# Patient Record
Sex: FEMALE
[De-identification: names, ages, dates, MRNs, and addresses within clinical notes are randomized per-mention and may not be internally consistent; named-entity substitution may affect disease eponyms.]

---

## 2024-07-14 PROBLEM — N83.202 LEFT OVARIAN CYST: Status: ACTIVE | Noted: 2024-07-14

## 2024-07-14 PROBLEM — Z00.00 ENCOUNTER FOR PREVENTIVE HEALTH EXAMINATION: Status: ACTIVE | Noted: 2024-07-14

## 2024-07-15 ENCOUNTER — APPOINTMENT (OUTPATIENT)
Dept: GYNECOLOGIC ONCOLOGY | Facility: CLINIC | Age: 24
End: 2024-07-15
Payer: MEDICAID

## 2024-07-15 VITALS
OXYGEN SATURATION: 99 % | DIASTOLIC BLOOD PRESSURE: 69 MMHG | SYSTOLIC BLOOD PRESSURE: 110 MMHG | HEART RATE: 72 BPM | HEIGHT: 62 IN | BODY MASS INDEX: 23.37 KG/M2 | WEIGHT: 127 LBS | RESPIRATION RATE: 16 BRPM

## 2024-07-15 DIAGNOSIS — Z86.59 PERSONAL HISTORY OF OTHER MENTAL AND BEHAVIORAL DISORDERS: ICD-10-CM

## 2024-07-15 DIAGNOSIS — Z80.8 FAMILY HISTORY OF MALIGNANT NEOPLASM OF OTHER ORGANS OR SYSTEMS: ICD-10-CM

## 2024-07-15 DIAGNOSIS — Z78.9 OTHER SPECIFIED HEALTH STATUS: ICD-10-CM

## 2024-07-15 DIAGNOSIS — N83.202 UNSPECIFIED OVARIAN CYST, LEFT SIDE: ICD-10-CM

## 2024-07-15 PROCEDURE — 99204 OFFICE O/P NEW MOD 45 MIN: CPT

## 2024-07-15 PROCEDURE — 99459 PELVIC EXAMINATION: CPT

## 2024-07-15 RX ORDER — CIPROFLOXACIN HYDROCHLORIDE 250 MG/1
250 TABLET, FILM COATED ORAL
Refills: 0 | Status: ACTIVE | COMMUNITY

## 2024-07-15 RX ORDER — ESCITALOPRAM OXALATE 10 MG/1
10 TABLET, FILM COATED ORAL
Refills: 0 | Status: ACTIVE | COMMUNITY

## 2024-07-22 ENCOUNTER — NON-APPOINTMENT (OUTPATIENT)
Age: 24
End: 2024-07-22

## 2024-08-12 ENCOUNTER — APPOINTMENT (OUTPATIENT)
Dept: ULTRASOUND IMAGING | Facility: CLINIC | Age: 24
End: 2024-08-12
Payer: MEDICAID

## 2024-08-12 PROCEDURE — 76830 TRANSVAGINAL US NON-OB: CPT

## 2024-08-16 ENCOUNTER — APPOINTMENT (OUTPATIENT)
Dept: GYNECOLOGIC ONCOLOGY | Facility: HOSPITAL | Age: 24
End: 2024-08-16

## 2024-08-16 ENCOUNTER — TRANSCRIPTION ENCOUNTER (OUTPATIENT)
Age: 24
End: 2024-08-16

## 2024-08-17 ENCOUNTER — TRANSCRIPTION ENCOUNTER (OUTPATIENT)
Age: 24
End: 2024-08-17

## 2024-08-20 PROBLEM — F41.9 ANXIETY DISORDER, UNSPECIFIED: Chronic | Status: ACTIVE | Noted: 2024-08-13

## 2024-08-21 ENCOUNTER — APPOINTMENT (OUTPATIENT)
Dept: GYNECOLOGIC ONCOLOGY | Facility: CLINIC | Age: 24
End: 2024-08-21
Payer: MEDICAID

## 2024-08-21 VITALS
TEMPERATURE: 97.8 F | DIASTOLIC BLOOD PRESSURE: 83 MMHG | HEART RATE: 89 BPM | SYSTOLIC BLOOD PRESSURE: 118 MMHG | OXYGEN SATURATION: 99 % | RESPIRATION RATE: 16 BRPM

## 2024-08-21 DIAGNOSIS — Z48.89 ENCOUNTER FOR OTHER SPECIFIED SURGICAL AFTERCARE: ICD-10-CM

## 2024-08-21 PROCEDURE — 99024 POSTOP FOLLOW-UP VISIT: CPT

## 2024-08-21 NOTE — REASON FOR VISIT
[Post Op] : post op visit [de-identified] : 8/16/24 [de-identified] : Laparoscopic left salpingectomy;  left ovarian cystectomy [de-identified] : Pt called the office 8/20 with complaints of yellow foul smelling drainage from umbilical incision.  No other symptoms.   Advised to come to office today for assessment.  Denies f/c/n/v/d/vaginal bleeding, changes to bladder habits.  Denies unintentional weight loss or gain.  Has had some constipation.    Final path:  Pending.

## 2024-08-21 NOTE — ASSESSMENT
[FreeTextEntry1] :   -  Steri strips in place (pt had bandaids coverning steris)  Advised patient to not place bandaids over steris.  Take showers only and pat steri strips dry.   -  Umbilical incision.  CDI.  No s/s infection.  No drainage; no bleeding.  Advised patient incision looks WNL.  No infection.   -  Replaced steris on umblical incision site after cleaning the area with alcohol swab.   -  reiterated, no heavy lifting greater than 5 lbs.  Advised to walk frequently, increase fluids, increase intake of dietary protein.   -  Advised final path pending.  -  She will keep post op scheduled with Dr. Davalos for 9/11.   -  Advised to call in between with any concerns or questions. -  Pt verbalized understanding of plan and agrees. -  All questions answered.

## 2024-08-21 NOTE — DISCUSSION/SUMMARY
[Clean] : was clean [Dry] : was dry [Intact] : was intact [Erythema] : was not erythematous [Ecchymosis] : was not ecchymotic [None] : had no drainage [Normal Skin] : normal appearance [Normal Skin Turgor] : normal skin turgor [Firm] : soft [Tender] : nontender [Abnormal Bowel Sounds] : normal bowel sounds [Rebound] : no rebound tenderness [Guarding] : no guarding [Doing Well] : is doing well [Excellent Pain Control] : has excellent pain control [No Sign of Infection] : is showing no signs of infection [de-identified] : deferred

## 2024-09-11 ENCOUNTER — APPOINTMENT (OUTPATIENT)
Dept: GYNECOLOGIC ONCOLOGY | Facility: CLINIC | Age: 24
End: 2024-09-11
Payer: MEDICAID

## 2024-09-11 VITALS
WEIGHT: 127 LBS | HEART RATE: 87 BPM | OXYGEN SATURATION: 97 % | HEIGHT: 62 IN | DIASTOLIC BLOOD PRESSURE: 73 MMHG | SYSTOLIC BLOOD PRESSURE: 112 MMHG | BODY MASS INDEX: 23.37 KG/M2

## 2024-09-11 DIAGNOSIS — N83.202 UNSPECIFIED OVARIAN CYST, LEFT SIDE: ICD-10-CM

## 2024-09-11 PROCEDURE — 99024 POSTOP FOLLOW-UP VISIT: CPT

## 2024-09-11 NOTE — REASON FOR VISIT
[Post Op] : post op visit [de-identified] : 8/16/24 [de-identified] : Left ovarian cystectomy with left salpingectomy laparoscopically, exam under anesthesia of pelvis. [de-identified] : Patient doing well, no n/v/cp/sob, duyen reg diet, voiding freely + BMs incisions healing path benign cyst.

## 2024-09-11 NOTE — ASSESSMENT
[FreeTextEntry1] : Patient is doing well post op path reviewed - benign ovarian cyst was adherent to ovary discussed plan for f/u with gyn all quesitons answered

## 2024-09-11 NOTE — DISCUSSION/SUMMARY
[Clean] : was clean [Dry] : was dry [Erythema] : was not erythematous [Seroma] : had no seroma [Normal Skin] : normal appearance [Firm] : soft [Tender] : nontender [Abnormal Bowel Sounds] : normal bowel sounds [Rebound] : no rebound tenderness [Guarding] : no guarding [Incisional Hernia] : no incisional hernia [Mass] : no palpable mass [Doing Well] : is doing well [Excellent Pain Control] : has excellent pain control [No Sign of Infection] : is showing no signs of infection [FreeTextEntry1] : umbilical incision slower healing, but in tact, no evidence of infection

## 2025-03-15 ENCOUNTER — APPOINTMENT (OUTPATIENT)
Dept: OBGYN | Facility: CLINIC | Age: 25
End: 2025-03-15
Payer: MEDICAID

## 2025-03-15 VITALS
HEART RATE: 73 BPM | SYSTOLIC BLOOD PRESSURE: 112 MMHG | WEIGHT: 123.38 LBS | RESPIRATION RATE: 16 BRPM | OXYGEN SATURATION: 99 % | TEMPERATURE: 98.3 F | DIASTOLIC BLOOD PRESSURE: 78 MMHG

## 2025-03-15 DIAGNOSIS — N83.202 UNSPECIFIED OVARIAN CYST, LEFT SIDE: ICD-10-CM

## 2025-03-15 PROCEDURE — 99204 OFFICE O/P NEW MOD 45 MIN: CPT

## 2025-03-18 ENCOUNTER — APPOINTMENT (OUTPATIENT)
Dept: ULTRASOUND IMAGING | Facility: CLINIC | Age: 25
End: 2025-03-18
Payer: MEDICAID

## 2025-03-18 PROCEDURE — 76830 TRANSVAGINAL US NON-OB: CPT

## 2025-03-18 PROCEDURE — 76856 US EXAM PELVIC COMPLETE: CPT

## 2025-03-24 ENCOUNTER — APPOINTMENT (OUTPATIENT)
Dept: OBGYN | Facility: CLINIC | Age: 25
End: 2025-03-24
Payer: MEDICAID

## 2025-03-24 VITALS
HEART RATE: 85 BPM | RESPIRATION RATE: 16 BRPM | WEIGHT: 125 LBS | SYSTOLIC BLOOD PRESSURE: 108 MMHG | DIASTOLIC BLOOD PRESSURE: 72 MMHG | TEMPERATURE: 98.3 F | OXYGEN SATURATION: 99 %

## 2025-03-24 DIAGNOSIS — N83.202 UNSPECIFIED OVARIAN CYST, LEFT SIDE: ICD-10-CM

## 2025-03-24 PROCEDURE — 99213 OFFICE O/P EST LOW 20 MIN: CPT

## 2025-06-23 ENCOUNTER — APPOINTMENT (OUTPATIENT)
Dept: OBGYN | Facility: CLINIC | Age: 25
End: 2025-06-23
Payer: MEDICAID

## 2025-06-23 ENCOUNTER — ASOB RESULT (OUTPATIENT)
Age: 25
End: 2025-06-23

## 2025-06-23 VITALS
WEIGHT: 125 LBS | DIASTOLIC BLOOD PRESSURE: 74 MMHG | TEMPERATURE: 98.6 F | SYSTOLIC BLOOD PRESSURE: 112 MMHG | HEART RATE: 90 BPM | RESPIRATION RATE: 16 BRPM | OXYGEN SATURATION: 98 %

## 2025-06-23 PROBLEM — Z01.419 ENCOUNTER FOR ANNUAL ROUTINE GYNECOLOGICAL EXAMINATION: Status: ACTIVE | Noted: 2025-06-23

## 2025-06-23 PROBLEM — Z30.09 BIRTH CONTROL COUNSELING: Status: ACTIVE | Noted: 2025-06-23

## 2025-06-23 PROCEDURE — 76856 US EXAM PELVIC COMPLETE: CPT | Mod: 59

## 2025-06-23 PROCEDURE — 99459 PELVIC EXAMINATION: CPT

## 2025-06-23 PROCEDURE — 99395 PREV VISIT EST AGE 18-39: CPT | Mod: 25

## 2025-06-23 PROCEDURE — 76830 TRANSVAGINAL US NON-OB: CPT

## 2025-06-23 RX ORDER — DROSPIRENONE AND ETHINYL ESTRADIOL 0.02-3(28)
3-0.02 KIT ORAL DAILY
Qty: 1 | Refills: 5 | Status: ACTIVE | COMMUNITY
Start: 2025-06-23 | End: 1900-01-01

## 2025-06-25 LAB
C TRACH RRNA SPEC QL NAA+PROBE: NOT DETECTED
N GONORRHOEA RRNA SPEC QL NAA+PROBE: NOT DETECTED
SOURCE TP AMPLIFICATION: NORMAL

## 2025-06-26 LAB — CYTOLOGY CVX/VAG DOC THIN PREP: NORMAL

## 2025-09-19 ENCOUNTER — ASOB RESULT (OUTPATIENT)
Age: 25
End: 2025-09-19

## 2025-09-19 ENCOUNTER — APPOINTMENT (OUTPATIENT)
Dept: OBGYN | Facility: CLINIC | Age: 25
End: 2025-09-19

## 2025-09-19 VITALS
HEART RATE: 84 BPM | SYSTOLIC BLOOD PRESSURE: 117 MMHG | OXYGEN SATURATION: 96 % | DIASTOLIC BLOOD PRESSURE: 69 MMHG | RESPIRATION RATE: 16 BRPM

## 2025-09-19 DIAGNOSIS — N83.202 UNSPECIFIED OVARIAN CYST, LEFT SIDE: ICD-10-CM
